# Patient Record
Sex: MALE | Race: WHITE
[De-identification: names, ages, dates, MRNs, and addresses within clinical notes are randomized per-mention and may not be internally consistent; named-entity substitution may affect disease eponyms.]

---

## 2020-03-16 ENCOUNTER — HOSPITAL ENCOUNTER (INPATIENT)
Dept: HOSPITAL 95 - ER | Age: 70
LOS: 2 days | Discharge: HOME | DRG: 299 | End: 2020-03-18
Attending: INTERNAL MEDICINE | Admitting: INTERNAL MEDICINE
Payer: MEDICARE

## 2020-03-16 VITALS — BODY MASS INDEX: 29.6 KG/M2 | WEIGHT: 206.79 LBS | HEIGHT: 70 IN

## 2020-03-16 DIAGNOSIS — I87.2: ICD-10-CM

## 2020-03-16 DIAGNOSIS — Z86.718: ICD-10-CM

## 2020-03-16 DIAGNOSIS — J96.01: ICD-10-CM

## 2020-03-16 DIAGNOSIS — J32.9: ICD-10-CM

## 2020-03-16 DIAGNOSIS — Z86.711: ICD-10-CM

## 2020-03-16 DIAGNOSIS — I26.99: ICD-10-CM

## 2020-03-16 DIAGNOSIS — I82.432: Primary | ICD-10-CM

## 2020-03-16 DIAGNOSIS — E03.9: ICD-10-CM

## 2020-03-16 LAB
ALBUMIN SERPL BCP-MCNC: 3.3 G/DL (ref 3.4–5)
ALBUMIN/GLOB SERPL: 0.9 {RATIO} (ref 0.8–1.8)
ALT SERPL W P-5'-P-CCNC: 26 U/L (ref 12–78)
ANION GAP SERPL CALCULATED.4IONS-SCNC: 6 MMOL/L (ref 6–16)
AST SERPL W P-5'-P-CCNC: 24 U/L (ref 12–37)
BASOPHILS # BLD AUTO: 0.06 K/MM3 (ref 0–0.23)
BASOPHILS NFR BLD AUTO: 1 % (ref 0–2)
BILIRUB SERPL-MCNC: 0.6 MG/DL (ref 0.1–1)
BUN SERPL-MCNC: 15 MG/DL (ref 8–24)
CALCIUM SERPL-MCNC: 8.8 MG/DL (ref 8.5–10.1)
CHLORIDE SERPL-SCNC: 107 MMOL/L (ref 98–108)
CO2 SERPL-SCNC: 26 MMOL/L (ref 21–32)
CREAT SERPL-MCNC: 1.03 MG/DL (ref 0.6–1.2)
DEPRECATED RDW RBC AUTO: 50.4 FL (ref 35.1–46.3)
EOSINOPHIL # BLD AUTO: 0.12 K/MM3 (ref 0–0.68)
EOSINOPHIL NFR BLD AUTO: 1 % (ref 0–6)
ERYTHROCYTE [DISTWIDTH] IN BLOOD BY AUTOMATED COUNT: 14.8 % (ref 11.7–14.2)
GLOBULIN SER CALC-MCNC: 3.5 G/DL (ref 2.2–4)
GLUCOSE SERPL-MCNC: 92 MG/DL (ref 70–99)
HCT VFR BLD AUTO: 45.9 % (ref 37–53)
HGB BLD-MCNC: 15.2 G/DL (ref 13.5–17.5)
IMM GRANULOCYTES # BLD AUTO: 0.05 K/MM3 (ref 0–0.1)
IMM GRANULOCYTES NFR BLD AUTO: 1 % (ref 0–1)
LYMPHOCYTES # BLD AUTO: 1.79 K/MM3 (ref 0.84–5.2)
LYMPHOCYTES NFR BLD AUTO: 17 % (ref 21–46)
MCHC RBC AUTO-ENTMCNC: 33.1 G/DL (ref 31.5–36.5)
MCV RBC AUTO: 93 FL (ref 80–100)
MONOCYTES # BLD AUTO: 1 K/MM3 (ref 0.16–1.47)
MONOCYTES NFR BLD AUTO: 9 % (ref 4–13)
NEUTROPHILS # BLD AUTO: 7.72 K/MM3 (ref 1.96–9.15)
NEUTROPHILS NFR BLD AUTO: 72 % (ref 41–73)
NRBC # BLD AUTO: 0 K/MM3 (ref 0–0.02)
NRBC BLD AUTO-RTO: 0 /100 WBC (ref 0–0.2)
PLATELET # BLD AUTO: 187 K/MM3 (ref 150–400)
POTASSIUM SERPL-SCNC: 4.3 MMOL/L (ref 3.5–5.5)
PROT SERPL-MCNC: 6.8 G/DL (ref 6.4–8.2)
PROTHROMBIN TIME: 11.2 SEC (ref 9.7–11.5)
SODIUM SERPL-SCNC: 139 MMOL/L (ref 136–145)
TROPONIN I SERPL-MCNC: 0.62 NG/ML (ref 0–0.04)

## 2020-03-16 PROCEDURE — C1751 CATH, INF, PER/CENT/MIDLINE: HCPCS

## 2020-03-16 NOTE — NUR
UPDATE
DISCUSSED WITH DR. ROBLES IN REGARDS TO COVID TEST. DR. ROBLES STATES TO
DISCONTINUE COVID TEST DUE TO THE CT SHOWING PE. PER DR. ROBLES PT CAN BE
TAKEN OUT OF DROPLET PRECAUTIONS AND THE PT DOES NOT NEED TO BE TESTED FOR
COVID-19.

## 2020-03-16 NOTE — NUR
ASSUMED CARE:
 
PT ARRIVED FROM ED. TELE PLACED, NSR WITH PVC. WIFE AT BEDSIDE. BOTH STATE
THEY HAVE BEEN FEELING SOB FOR LAST FEW DAYS AND RETURNED HOME FROM A CRUISE A
MONTH AGO. RESPIRATORY PANEL COMPLETED. AWAITING RESULTS TO CALL MD FOR
FURTHER ORDERS. ECHO TECH AT BEDSIDE AT THIS TIME.

## 2020-03-16 NOTE — NUR
NURSING SUPERVISOR AND NIGHT CHARGE RN AWARE THAT DR MOSQUERA CANCELLED ICU
TRANSFER. NIGHT CHARGE RN ADVISED CALLING [facility] DR NOT NECESSARY AT THIS TIME.

## 2020-03-16 NOTE — NUR
SHIFT SUMMARY:
 
DR MOSQUERA NOTIFIED OF CONSULT AND CAME TO SEE PT. DR MOSQUERA STATES ICU BED NOT
NEEDED BUT HEPARIN GTT SHOULD BE STARTED NOW. CHARGE RN CALLED LAB AND
PHARMACY TO GET MEDICATION IMMEDIATELY. PT ON 1L NC, SATTING LOW 90S. SON AT
BEDSIDE. DR ROBLES CONFIRMED NO COVID R.O NEEDED AT THIS TIME DUE TO CT
RESULT. PT AND FAMILY MADE AWARE. HEPARIN GTT RUNNING AND CONFIRMED BY PRISCA MICHAELS.

## 2020-03-17 LAB
ALBUMIN SERPL BCP-MCNC: 2.9 G/DL (ref 3.4–5)
ALBUMIN/GLOB SERPL: 0.9 {RATIO} (ref 0.8–1.8)
ALT SERPL W P-5'-P-CCNC: 22 U/L (ref 12–78)
ANION GAP SERPL CALCULATED.4IONS-SCNC: 6 MMOL/L (ref 6–16)
AST SERPL W P-5'-P-CCNC: 17 U/L (ref 12–37)
BASOPHILS # BLD AUTO: 0.09 K/MM3 (ref 0–0.23)
BASOPHILS NFR BLD AUTO: 1 % (ref 0–2)
BILIRUB SERPL-MCNC: 0.4 MG/DL (ref 0.1–1)
BUN SERPL-MCNC: 16 MG/DL (ref 8–24)
CALCIUM SERPL-MCNC: 8.3 MG/DL (ref 8.5–10.1)
CHLORIDE SERPL-SCNC: 109 MMOL/L (ref 98–108)
CO2 SERPL-SCNC: 27 MMOL/L (ref 21–32)
CREAT SERPL-MCNC: 1.09 MG/DL (ref 0.6–1.2)
DEPRECATED RDW RBC AUTO: 51.1 FL (ref 35.1–46.3)
EOSINOPHIL # BLD AUTO: 0.17 K/MM3 (ref 0–0.68)
EOSINOPHIL NFR BLD AUTO: 2 % (ref 0–6)
ERYTHROCYTE [DISTWIDTH] IN BLOOD BY AUTOMATED COUNT: 14.9 % (ref 11.7–14.2)
GLOBULIN SER CALC-MCNC: 3.2 G/DL (ref 2.2–4)
GLUCOSE SERPL-MCNC: 97 MG/DL (ref 70–99)
HCT VFR BLD AUTO: 44 % (ref 37–53)
HGB BLD-MCNC: 14.4 G/DL (ref 13.5–17.5)
IMM GRANULOCYTES # BLD AUTO: 0.03 K/MM3 (ref 0–0.1)
IMM GRANULOCYTES NFR BLD AUTO: 0 % (ref 0–1)
LYMPHOCYTES # BLD AUTO: 2.28 K/MM3 (ref 0.84–5.2)
LYMPHOCYTES NFR BLD AUTO: 27 % (ref 21–46)
MCHC RBC AUTO-ENTMCNC: 32.7 G/DL (ref 31.5–36.5)
MCV RBC AUTO: 92 FL (ref 80–100)
MONOCYTES # BLD AUTO: 0.76 K/MM3 (ref 0.16–1.47)
MONOCYTES NFR BLD AUTO: 9 % (ref 4–13)
NEUTROPHILS # BLD AUTO: 5.05 K/MM3 (ref 1.96–9.15)
NEUTROPHILS NFR BLD AUTO: 60 % (ref 41–73)
NRBC # BLD AUTO: 0 K/MM3 (ref 0–0.02)
NRBC BLD AUTO-RTO: 0 /100 WBC (ref 0–0.2)
PLATELET # BLD AUTO: 188 K/MM3 (ref 150–400)
POTASSIUM SERPL-SCNC: 4.7 MMOL/L (ref 3.5–5.5)
PROT SERPL-MCNC: 6.1 G/DL (ref 6.4–8.2)
SODIUM SERPL-SCNC: 142 MMOL/L (ref 136–145)
TROPONIN I SERPL-MCNC: 0.41 NG/ML (ref 0–0.04)

## 2020-03-17 NOTE — NUR
SHIFT SUMMARY
PATIENT PLEASENT AND COOPERATIVE THROUGHOUT THE NIGHT LAST NIGHT. PATIENT
APPEARED TO SLEEP WELL WITH NO COMPLAINTS OF PAIN. PATIENT REPROTED SHORTNESS
OF BREATH WAS "OKAY" THROUGHOUT THE SHIFT LAST NIGHT. HEPARIN GTT RUNNING PER
ORDERS, VERIFIED WITH ONCOMING RN. BEDSIDE REPORT GIVEN TO ONCOMING RN.

## 2020-03-17 NOTE — NUR
RECIEVED REPORT FROM PCU PRISCA JOSUE AT 1700. THE PATIENT AMBULATED WITH TECH UP
TO ROOM 329. PATIENT IS PLEASANT AND COOPERATIVE WITH STAFF. NO NEEDS AT THIS
TIME. WILL PROVIDE CARE AS NEEDED.

## 2020-03-17 NOTE — NUR
MEDICAL FLOOR TRANSFER SUMMARY
PATIENT REMAINS T/O CARE TODAY ON ROOM AIR. TOLERATING UP TO BATHROOM AND
SHOWERING WITHOUT SIGNIFICANT SOB. ON ROOM AIR. HEPARIN GTT DISCONTINUED AND
PO XAROLTO STARED. FIRST DOSE GIVEN. PT TRANSFERED VIA WC TO ROOM 324 AFTER
REPORT GIVEN TO MEDICAL FLOOR PRIS[facility] WE[facility] BY JOSELO HU RN. PATIENT REMAINS
IN SINUS RYTHYM

## 2020-03-18 LAB
ALBUMIN SERPL BCP-MCNC: 2.9 G/DL (ref 3.4–5)
ALBUMIN/GLOB SERPL: 0.9 {RATIO} (ref 0.8–1.8)
ALT SERPL W P-5'-P-CCNC: 25 U/L (ref 12–78)
ANION GAP SERPL CALCULATED.4IONS-SCNC: 4 MMOL/L (ref 6–16)
AST SERPL W P-5'-P-CCNC: 17 U/L (ref 12–37)
BASOPHILS # BLD AUTO: 0.07 K/MM3 (ref 0–0.23)
BASOPHILS NFR BLD AUTO: 1 % (ref 0–2)
BILIRUB SERPL-MCNC: 0.4 MG/DL (ref 0.1–1)
BUN SERPL-MCNC: 13 MG/DL (ref 8–24)
CALCIUM SERPL-MCNC: 8.5 MG/DL (ref 8.5–10.1)
CHLORIDE SERPL-SCNC: 109 MMOL/L (ref 98–108)
CO2 SERPL-SCNC: 28 MMOL/L (ref 21–32)
CREAT SERPL-MCNC: 1.08 MG/DL (ref 0.6–1.2)
DEPRECATED RDW RBC AUTO: 50.2 FL (ref 35.1–46.3)
EOSINOPHIL # BLD AUTO: 0.26 K/MM3 (ref 0–0.68)
EOSINOPHIL NFR BLD AUTO: 3 % (ref 0–6)
ERYTHROCYTE [DISTWIDTH] IN BLOOD BY AUTOMATED COUNT: 14.9 % (ref 11.7–14.2)
GLOBULIN SER CALC-MCNC: 3.2 G/DL (ref 2.2–4)
GLUCOSE SERPL-MCNC: 95 MG/DL (ref 70–99)
HCT VFR BLD AUTO: 44.9 % (ref 37–53)
HGB BLD-MCNC: 14.6 G/DL (ref 13.5–17.5)
IMM GRANULOCYTES # BLD AUTO: 0.03 K/MM3 (ref 0–0.1)
IMM GRANULOCYTES NFR BLD AUTO: 0 % (ref 0–1)
LYMPHOCYTES # BLD AUTO: 1.92 K/MM3 (ref 0.84–5.2)
LYMPHOCYTES NFR BLD AUTO: 23 % (ref 21–46)
MCHC RBC AUTO-ENTMCNC: 32.5 G/DL (ref 31.5–36.5)
MCV RBC AUTO: 91 FL (ref 80–100)
MONOCYTES # BLD AUTO: 0.92 K/MM3 (ref 0.16–1.47)
MONOCYTES NFR BLD AUTO: 11 % (ref 4–13)
NEUTROPHILS # BLD AUTO: 5.21 K/MM3 (ref 1.96–9.15)
NEUTROPHILS NFR BLD AUTO: 62 % (ref 41–73)
NRBC # BLD AUTO: 0 K/MM3 (ref 0–0.02)
NRBC BLD AUTO-RTO: 0 /100 WBC (ref 0–0.2)
PLATELET # BLD AUTO: 178 K/MM3 (ref 150–400)
POTASSIUM SERPL-SCNC: 4.1 MMOL/L (ref 3.5–5.5)
PROT SERPL-MCNC: 6.1 G/DL (ref 6.4–8.2)
SODIUM SERPL-SCNC: 141 MMOL/L (ref 136–145)

## 2020-03-18 NOTE — NUR
SHIFT SUMMARY: PATIENT IS A&OX4, INDEPENDANT IN ROOM, VS ARE STABLE, TELI
SHOWS SR @ 63 WITH OCCASSIONAL PVC. TOLERATING DIET WELL AND COMPLAINT WITH
SCD'S.

## 2020-03-18 NOTE — NUR
DISCHARGE
 
PT DISCHARGED TO HOME. THIS RN EXPLAINED DISCHARGE INSTRUCTIONS AND
MEDICATIONS TO PT AND HE REPORTS HE UNDERSTANDS. MEDICATIONS FAXED TO [facility]. IV REMOVED WITHOUT DIFFICULTY. PT AMBULATORY INDEPENDENTLY. THIS RN
WALKED WITH PT TO PRIVATE CAR. PT'S BELONGINGS WITH PT.

## 2023-05-20 ENCOUNTER — HOSPITAL ENCOUNTER (EMERGENCY)
Dept: HOSPITAL 95 - ER | Age: 73
Discharge: HOME | End: 2023-05-20
Payer: COMMERCIAL

## 2023-05-20 VITALS — BODY MASS INDEX: 23.03 KG/M2 | HEIGHT: 72 IN | WEIGHT: 170 LBS

## 2023-05-20 VITALS — DIASTOLIC BLOOD PRESSURE: 77 MMHG | SYSTOLIC BLOOD PRESSURE: 146 MMHG
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
## 2023-05-20 DIAGNOSIS — S62.664A: ICD-10-CM

## 2023-05-20 DIAGNOSIS — S62.620A: Primary | ICD-10-CM

## 2023-05-20 DIAGNOSIS — Z79.01: ICD-10-CM

## 2023-05-20 DIAGNOSIS — Z88.2: ICD-10-CM

## 2023-05-20 DIAGNOSIS — S62.662A: ICD-10-CM
